# Patient Record
(demographics unavailable — no encounter records)

---

## 2025-01-24 NOTE — HISTORY OF PRESENT ILLNESS
[FreeTextEntry1] : Gauri is here for the F/U accompanied by Mom and Dad according to them he has been doing relatively better however as it appears on average he is having about 1 drop attacks /week parents especially the Mom are always around and  are always able to protect him .  otherwise he is at his baseline the blood levels done in October shows : Vit D =42 TPX level= 11.3 Keppra =21.6 normal CBC and CMP Today the VNS was interrogated The out put was increased The current setting are: output=1.875 frequency=25hz Pulse width= 250msec on time=30 off time=3 min Duty cycle= 16%

## 2025-01-24 NOTE — PHYSICAL EXAM
[FreeTextEntry1] : A/A No eye contact no verbal output + some self stim behavior slightly slouched posture stable compromised gait.

## 2025-01-24 NOTE — ASSESSMENT
[FreeTextEntry1] : 1- autistic spectrum D/O 2- refractory seizure D/O   Plan continue with the newly adjusted setting F/U discussed the options and the callosotomy . parents are in Favour of current level